# Patient Record
Sex: FEMALE | Race: WHITE | URBAN - METROPOLITAN AREA
[De-identification: names, ages, dates, MRNs, and addresses within clinical notes are randomized per-mention and may not be internally consistent; named-entity substitution may affect disease eponyms.]

---
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
## 2024-09-17 NOTE — PROCEDURE: PRESCRIPTION MEDICATION MANAGEMENT
No
Detail Level: Zone
Render In Strict Bullet Format?: No
Continue Regimen: -\\nWart peel: Once healed from treatment, if wart on finger is not resolved, apply to wart QHS, cover with tape, and wash off in the morning.